# Patient Record
Sex: FEMALE | Race: BLACK OR AFRICAN AMERICAN | NOT HISPANIC OR LATINO | Employment: FULL TIME | URBAN - METROPOLITAN AREA
[De-identification: names, ages, dates, MRNs, and addresses within clinical notes are randomized per-mention and may not be internally consistent; named-entity substitution may affect disease eponyms.]

---

## 2020-10-17 ENCOUNTER — OFFICE VISIT (OUTPATIENT)
Dept: URGENT CARE | Facility: CLINIC | Age: 65
End: 2020-10-17
Payer: MEDICARE

## 2020-10-17 VITALS
HEART RATE: 103 BPM | SYSTOLIC BLOOD PRESSURE: 145 MMHG | HEIGHT: 67 IN | TEMPERATURE: 98 F | DIASTOLIC BLOOD PRESSURE: 89 MMHG | WEIGHT: 230 LBS | OXYGEN SATURATION: 98 % | RESPIRATION RATE: 18 BRPM | BODY MASS INDEX: 36.1 KG/M2

## 2020-10-17 DIAGNOSIS — M79.10 MUSCULAR PAIN: ICD-10-CM
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
## 2020-10-17 DIAGNOSIS — R07.89 MUSCULOSKELETAL CHEST PAIN: ICD-10-CM

## 2020-10-17 PROCEDURE — 73090 X-RAY EXAM OF FOREARM: CPT | Mod: FY,LT,S$GLB, | Performed by: RADIOLOGY

## 2020-10-17 PROCEDURE — 99214 PR OFFICE/OUTPT VISIT, EST, LEVL IV, 30-39 MIN: ICD-10-PCS | Mod: S$GLB,,, | Performed by: PHYSICIAN ASSISTANT

## 2020-10-17 PROCEDURE — 73090 XR FOREARM LEFT: ICD-10-PCS | Mod: FY,LT,S$GLB, | Performed by: RADIOLOGY

## 2020-10-17 PROCEDURE — 73590 XR TIBIA FIBULA 2 VIEW RIGHT: ICD-10-PCS | Mod: FY,RT,S$GLB, | Performed by: RADIOLOGY

## 2020-10-17 PROCEDURE — 71100 XR RIBS 2 VIEW LEFT: ICD-10-PCS | Mod: FY,LT,S$GLB, | Performed by: RADIOLOGY

## 2020-10-17 PROCEDURE — 99214 OFFICE O/P EST MOD 30 MIN: CPT | Mod: S$GLB,,, | Performed by: PHYSICIAN ASSISTANT

## 2020-10-17 PROCEDURE — 71100 X-RAY EXAM RIBS UNI 2 VIEWS: CPT | Mod: FY,LT,S$GLB, | Performed by: RADIOLOGY

## 2020-10-17 PROCEDURE — 73590 X-RAY EXAM OF LOWER LEG: CPT | Mod: FY,RT,S$GLB, | Performed by: RADIOLOGY

## 2020-10-17 RX ORDER — ACETAMINOPHEN 500 MG
500 TABLET ORAL EVERY 8 HOURS PRN
Qty: 30 TABLET | Refills: 0 | Status: SHIPPED | OUTPATIENT
Start: 2020-10-17 | End: 2020-10-24

## 2020-10-17 RX ORDER — METHOCARBAMOL 500 MG/1
500 TABLET, FILM COATED ORAL 3 TIMES DAILY PRN
Qty: 30 TABLET | Refills: 0 | Status: SHIPPED | OUTPATIENT
Start: 2020-10-17 | End: 2020-10-27
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
## 2023-08-11 ENCOUNTER — OCCUPATIONAL HEALTH (OUTPATIENT)
Dept: URGENT CARE | Facility: CLINIC | Age: 68
End: 2023-08-11

## 2023-08-11 DIAGNOSIS — Z22.7 TB LUNG, LATENT: Primary | ICD-10-CM

## 2023-08-11 DIAGNOSIS — Z13.9 ENCOUNTER FOR SCREENING: ICD-10-CM

## 2023-08-11 PROCEDURE — 71045 XR CHEST 1 VIEW: ICD-10-PCS | Mod: S$GLB,,, | Performed by: RADIOLOGY

## 2023-08-11 PROCEDURE — 71045 X-RAY EXAM CHEST 1 VIEW: CPT | Mod: S$GLB,,, | Performed by: RADIOLOGY

## 2024-01-31 ENCOUNTER — HOSPITAL ENCOUNTER (EMERGENCY)
Facility: HOSPITAL | Age: 69
Discharge: HOME | End: 2024-01-31
Attending: EMERGENCY MEDICINE

## 2024-01-31 VITALS
OXYGEN SATURATION: 95 % | RESPIRATION RATE: 20 BRPM | SYSTOLIC BLOOD PRESSURE: 122 MMHG | TEMPERATURE: 98 F | HEIGHT: 67 IN | BODY MASS INDEX: 27 KG/M2 | WEIGHT: 172 LBS | DIASTOLIC BLOOD PRESSURE: 77 MMHG | HEART RATE: 66 BPM

## 2024-01-31 DIAGNOSIS — D64.9 ANEMIA, UNSPECIFIED TYPE: ICD-10-CM

## 2024-01-31 DIAGNOSIS — R04.0 EPISTAXIS: Primary | ICD-10-CM

## 2024-01-31 PROBLEM — E11.40 TYPE 2 DIABETES MELLITUS WITH DIABETIC NEUROPATHY, WITHOUT LONG-TERM CURRENT USE OF INSULIN: Status: ACTIVE | Noted: 2022-11-10

## 2024-01-31 PROBLEM — I87.2 VENOUS INSUFFICIENCY: Status: ACTIVE | Noted: 2022-11-10

## 2024-01-31 PROBLEM — S83.232D COMPLEX TEAR OF MEDIAL MENISCUS OF LEFT KNEE, SUBSEQUENT ENCOUNTER: Status: ACTIVE | Noted: 2022-06-20

## 2024-01-31 PROBLEM — M20.11 HAV (HALLUX ABDUCTO VALGUS), RIGHT: Status: ACTIVE | Noted: 2022-11-10

## 2024-01-31 PROBLEM — E66.01 CLASS 2 SEVERE OBESITY DUE TO EXCESS CALORIES WITH SERIOUS COMORBIDITY AND BODY MASS INDEX (BMI) OF 37.0 TO 37.9 IN ADULT: Status: ACTIVE | Noted: 2017-06-22

## 2024-01-31 PROBLEM — R05.3 CHRONIC COUGH: Status: ACTIVE | Noted: 2019-03-22

## 2024-01-31 PROBLEM — R26.9 ABNORMAL GAIT: Status: ACTIVE | Noted: 2022-11-10

## 2024-01-31 LAB
BASOPHILS # BLD AUTO: 0.02 K/UL (ref 0–0.2)
BASOPHILS NFR BLD: 0.3 % (ref 0–1.9)
DIFFERENTIAL METHOD BLD: ABNORMAL
EOSINOPHIL # BLD AUTO: 0.2 K/UL (ref 0–0.5)
EOSINOPHIL NFR BLD: 2.6 % (ref 0–8)
ERYTHROCYTE [DISTWIDTH] IN BLOOD BY AUTOMATED COUNT: 15.9 % (ref 11.5–14.5)
HCT VFR BLD AUTO: 36.8 % (ref 37–48.5)
HGB BLD-MCNC: 11.3 G/DL (ref 12–16)
IMM GRANULOCYTES # BLD AUTO: 0.02 K/UL (ref 0–0.04)
IMM GRANULOCYTES NFR BLD AUTO: 0.3 % (ref 0–0.5)
LYMPHOCYTES # BLD AUTO: 2.1 K/UL (ref 1–4.8)
LYMPHOCYTES NFR BLD: 34.3 % (ref 18–48)
MCH RBC QN AUTO: 26.4 PG (ref 27–31)
MCHC RBC AUTO-ENTMCNC: 30.7 G/DL (ref 32–36)
MCV RBC AUTO: 86 FL (ref 82–98)
MONOCYTES # BLD AUTO: 0.6 K/UL (ref 0.3–1)
MONOCYTES NFR BLD: 8.8 % (ref 4–15)
NEUTROPHILS # BLD AUTO: 3.4 K/UL (ref 1.8–7.7)
NEUTROPHILS NFR BLD: 53.7 % (ref 38–73)
NRBC BLD-RTO: 0 /100 WBC
PLATELET # BLD AUTO: 275 K/UL (ref 150–450)
PMV BLD AUTO: 9.9 FL (ref 9.2–12.9)
RBC # BLD AUTO: 4.28 M/UL (ref 4–5.4)
WBC # BLD AUTO: 6.24 K/UL (ref 3.9–12.7)

## 2024-01-31 PROCEDURE — 99283 EMERGENCY DEPT VISIT LOW MDM: CPT

## 2024-01-31 PROCEDURE — 85025 COMPLETE CBC W/AUTO DIFF WBC: CPT | Performed by: PHYSICIAN ASSISTANT

## 2024-01-31 RX ORDER — AMLODIPINE BESYLATE 5 MG/1
1 TABLET ORAL DAILY
COMMUNITY
Start: 2023-08-22

## 2024-01-31 RX ORDER — CLINDAMYCIN HYDROCHLORIDE 300 MG/1
300 CAPSULE ORAL 3 TIMES DAILY
COMMUNITY
Start: 2024-01-23

## 2024-01-31 RX ORDER — METFORMIN HYDROCHLORIDE 500 MG/1
1 TABLET, EXTENDED RELEASE ORAL DAILY
COMMUNITY
Start: 2023-12-05 | End: 2024-12-04

## 2024-01-31 RX ORDER — ATORVASTATIN CALCIUM 20 MG/1
1 TABLET, FILM COATED ORAL NIGHTLY
COMMUNITY
Start: 2023-08-22

## 2024-01-31 RX ORDER — CALCIUM CARBONATE 600 MG
3 TABLET ORAL DAILY
COMMUNITY
Start: 2023-05-02

## 2024-01-31 RX ORDER — LOSARTAN POTASSIUM 50 MG/1
1 TABLET ORAL DAILY
COMMUNITY
Start: 2023-08-22

## 2024-01-31 RX ORDER — VIT C/E/ZN/COPPR/LUTEIN/ZEAXAN 250MG-90MG
1 CAPSULE ORAL DAILY
COMMUNITY
Start: 2023-05-02

## 2024-01-31 RX ORDER — BUDESONIDE AND FORMOTEROL FUMARATE DIHYDRATE 160; 4.5 UG/1; UG/1
2 AEROSOL RESPIRATORY (INHALATION)
COMMUNITY
Start: 2023-12-05 | End: 2024-12-04

## 2024-01-31 RX ORDER — LEVOTHYROXINE SODIUM 88 UG/1
1 TABLET ORAL EVERY MORNING
COMMUNITY
Start: 2023-08-22

## 2024-01-31 RX ORDER — ALBUTEROL SULFATE 90 UG/1
2 AEROSOL, METERED RESPIRATORY (INHALATION) EVERY 6 HOURS PRN
COMMUNITY
Start: 2023-12-05 | End: 2024-12-04

## 2024-01-31 NOTE — ED TRIAGE NOTES
"Teresita Cassidy, a 68 y.o. female presents to the ED w/ complaint of epistaxis. Pt states sx have been going on for two weeks now. She bleeds to the point she soaks paper towels and describes "big blood clots" coming out of her nose. Pt denies blood thinners, HA or dizziness.     Triage note:  Chief Complaint   Patient presents with    Epistaxis     Resolved prior to arrival. Denies blood thinners     Review of patient's allergies indicates:  No Known Allergies  No past medical history on file.  "

## 2024-01-31 NOTE — ED PROVIDER NOTES
Encounter Date: 1/31/2024       History     Chief Complaint   Patient presents with    Epistaxis     Resolved prior to arrival. Denies blood thinners     67 y.o. w/ a PMHx of COPD, T2DM, HTN, HLD presents to the ED after episode of epistaxis. She had 2-3 episodes today. Reports intermittent episodes over the past 2 weeks. Denies nose bleeds in the past. Reports bleeding from right nare only. She starting using nasal spray for sinus congestion around that time. She is unsure of the name. She is not on blood thinners. She denies dizziness, lightheadedness, headache, weakness, chest pain, shortness of breath.     The history is provided by the patient.     Review of patient's allergies indicates:  No Known Allergies  History reviewed. No pertinent past medical history.  History reviewed. No pertinent surgical history.  History reviewed. No pertinent family history.  Social History     Tobacco Use    Smoking status: Never    Smokeless tobacco: Never     Review of Systems   Constitutional:  Negative for fever.   HENT:  Positive for nosebleeds. Negative for trouble swallowing.    Respiratory:  Negative for shortness of breath.    Cardiovascular:  Negative for chest pain.   Neurological:  Negative for dizziness, weakness, light-headedness and headaches.       Physical Exam     Initial Vitals [01/31/24 1604]   BP Pulse Resp Temp SpO2   (!) 143/84 77 18 97.7 °F (36.5 °C) 97 %      MAP       --         Physical Exam    Nursing note and vitals reviewed.  Constitutional: She appears well-developed and well-nourished. She is not diaphoretic. No distress.   HENT:   Head: Normocephalic and atraumatic.   Nose: Nose normal. No sinus tenderness or nasal septal hematoma.   Mouth/Throat: Uvula is midline and oropharynx is clear and moist. No oropharyngeal exudate.   Dried blood in R nare. No active bleeding.  Posterior oropharynx is clear   Eyes: Conjunctivae and EOM are normal.   Neck: Neck supple.   Cardiovascular:  Normal rate.            Pulmonary/Chest: No respiratory distress.   Musculoskeletal:      Cervical back: Neck supple.     Neurological: She is alert and oriented to person, place, and time. Gait normal.   Skin: No rash noted.   Psychiatric: She has a normal mood and affect. Thought content normal.         ED Course   Procedures  Labs Reviewed   CBC W/ AUTO DIFFERENTIAL - Abnormal; Notable for the following components:       Result Value    Hemoglobin 11.3 (*)     Hematocrit 36.8 (*)     MCH 26.4 (*)     MCHC 30.7 (*)     RDW 15.9 (*)     All other components within normal limits          Imaging Results    None          Medications - No data to display  Medical Decision Making  67 y.o. w/ a PMHx of COPD, T2DM, HTN, HLD presents to the ED after episode of epistaxis. Nontoxic appearing.  Vitals with hypertension. Afebrile. Exam as above. I will initiate workup and reassess.    Ddx:  Anterior epistaxis, platelet dysfunction, anemia    CBC with normal platelets. Mild anemia noted though, at stable level. Last CBC in 5/2023.  No epistaxis while in the emergency department. Will please referral with ENT.  I discussed nosebleed return precautions and how to control nosebleed at home.  She is agreeable to this plan. Given that bleeding is controlled and no platelet dysfunction, she is stable for discharge. Strict ED precautions given to return immediately for new, worsening, or concerning symptoms    Amount and/or Complexity of Data Reviewed  Labs: ordered.                                      Clinical Impression:  Final diagnoses:  [R04.0] Epistaxis (Primary)  [D64.9] Anemia, unspecified type                 Amara Isabel PA-C  01/31/24 7026

## 2024-02-01 NOTE — DISCHARGE INSTRUCTIONS
To keep from getting another nosebleed right away, for the next 24 hours avoid: Heavy lifting, Bending over, Blowing your nose very hard, Picking your nose      If your nose starts to bleed again, follow these steps:  Blow your nose gently. This may increase bleeding for a moment.  Sit, leaning forward slightly. Do not lie down or the blood will run down your throat.  Pinch the soft area towards the bottom of your nose, just below the bony part.  Hold it shut for at least 5 to 15 minutes. Do not check sooner to see if bleeding has stopped.  If your nose keeps bleeding, repeat these steps one time, but hold your nose pinched shut for 10 to 30 minutes. If it continues to bleed, go to the ER or call your doctor.      Referral placed with ENT for follow up    You may use saline spray and a humidifier to keep nasal mucosa moist.     Anemia noted. Please follow up with your PCP for this finding.     Strict ED precautions given to return immediately for new, worsening, or concerning symptoms